# Patient Record
Sex: FEMALE | Employment: FULL TIME | URBAN - METROPOLITAN AREA
[De-identification: names, ages, dates, MRNs, and addresses within clinical notes are randomized per-mention and may not be internally consistent; named-entity substitution may affect disease eponyms.]

---

## 2024-10-26 ENCOUNTER — HOSPITAL ENCOUNTER (EMERGENCY)
Facility: HOSPITAL | Age: 34
Discharge: HOME | End: 2024-10-26
Attending: EMERGENCY MEDICINE

## 2024-10-26 ENCOUNTER — APPOINTMENT (EMERGENCY)
Dept: CT IMAGING | Facility: HOSPITAL | Age: 34
End: 2024-10-26

## 2024-10-26 VITALS
RESPIRATION RATE: 15 BRPM | SYSTOLIC BLOOD PRESSURE: 107 MMHG | OXYGEN SATURATION: 99 % | TEMPERATURE: 98.4 F | WEIGHT: 158.07 LBS | DIASTOLIC BLOOD PRESSURE: 72 MMHG | HEART RATE: 69 BPM

## 2024-10-26 DIAGNOSIS — N12 PYELONEPHRITIS: Primary | ICD-10-CM

## 2024-10-26 DIAGNOSIS — D64.9 ANEMIA: ICD-10-CM

## 2024-10-26 LAB
ALBUMIN SERPL BCG-MCNC: 3.9 G/DL (ref 3.5–5)
ALP SERPL-CCNC: 97 U/L (ref 34–104)
ALT SERPL W P-5'-P-CCNC: 16 U/L (ref 7–52)
ANION GAP SERPL CALCULATED.3IONS-SCNC: 6 MMOL/L (ref 4–13)
AST SERPL W P-5'-P-CCNC: 24 U/L (ref 13–39)
BACTERIA UR QL AUTO: NORMAL /HPF
BASOPHILS # BLD AUTO: 0.1 THOUSANDS/ΜL (ref 0–0.1)
BASOPHILS NFR BLD AUTO: 2 % (ref 0–1)
BILIRUB SERPL-MCNC: 0.23 MG/DL (ref 0.2–1)
BILIRUB UR QL STRIP: NEGATIVE
BUN SERPL-MCNC: 8 MG/DL (ref 5–25)
CALCIUM SERPL-MCNC: 8.5 MG/DL (ref 8.4–10.2)
CHLORIDE SERPL-SCNC: 106 MMOL/L (ref 96–108)
CLARITY UR: CLEAR
CO2 SERPL-SCNC: 26 MMOL/L (ref 21–32)
COLOR UR: YELLOW
CREAT SERPL-MCNC: 0.78 MG/DL (ref 0.6–1.3)
EOSINOPHIL # BLD AUTO: 0.18 THOUSAND/ΜL (ref 0–0.61)
EOSINOPHIL NFR BLD AUTO: 3 % (ref 0–6)
ERYTHROCYTE [DISTWIDTH] IN BLOOD BY AUTOMATED COUNT: 18.6 % (ref 11.6–15.1)
EXT PREGNANCY TEST URINE: NEGATIVE
EXT. CONTROL: NORMAL
FERRITIN SERPL-MCNC: 4 NG/ML (ref 11–307)
GFR SERPL CREATININE-BSD FRML MDRD: 98 ML/MIN/1.73SQ M
GLUCOSE SERPL-MCNC: 89 MG/DL (ref 65–140)
GLUCOSE UR STRIP-MCNC: NEGATIVE MG/DL
HCT VFR BLD AUTO: 27 % (ref 34.8–46.1)
HGB BLD-MCNC: 7.9 G/DL (ref 11.5–15.4)
HGB UR QL STRIP.AUTO: NEGATIVE
IMM GRANULOCYTES # BLD AUTO: 0.01 THOUSAND/UL (ref 0–0.2)
IMM GRANULOCYTES NFR BLD AUTO: 0 % (ref 0–2)
IRON SATN MFR SERPL: 1 % (ref 15–50)
IRON SERPL-MCNC: 10 UG/DL (ref 50–212)
KETONES UR STRIP-MCNC: NEGATIVE MG/DL
LEUKOCYTE ESTERASE UR QL STRIP: ABNORMAL
LIPASE SERPL-CCNC: 56 U/L (ref 11–82)
LYMPHOCYTES # BLD AUTO: 2.76 THOUSANDS/ΜL (ref 0.6–4.47)
LYMPHOCYTES NFR BLD AUTO: 45 % (ref 14–44)
MCH RBC QN AUTO: 20.2 PG (ref 26.8–34.3)
MCHC RBC AUTO-ENTMCNC: 29.3 G/DL (ref 31.4–37.4)
MCV RBC AUTO: 69 FL (ref 82–98)
MONOCYTES # BLD AUTO: 0.95 THOUSAND/ΜL (ref 0.17–1.22)
MONOCYTES NFR BLD AUTO: 16 % (ref 4–12)
NEUTROPHILS # BLD AUTO: 2.04 THOUSANDS/ΜL (ref 1.85–7.62)
NEUTS SEG NFR BLD AUTO: 34 % (ref 43–75)
NITRITE UR QL STRIP: NEGATIVE
NON-SQ EPI CELLS URNS QL MICRO: NORMAL /HPF
NRBC BLD AUTO-RTO: 0 /100 WBCS
PH UR STRIP.AUTO: 7 [PH] (ref 4.5–8)
PLATELET # BLD AUTO: 179 THOUSANDS/UL (ref 149–390)
PMV BLD AUTO: 10.3 FL (ref 8.9–12.7)
POTASSIUM SERPL-SCNC: 3.2 MMOL/L (ref 3.5–5.3)
PROT SERPL-MCNC: 7.1 G/DL (ref 6.4–8.4)
PROT UR STRIP-MCNC: NEGATIVE MG/DL
RBC # BLD AUTO: 3.92 MILLION/UL (ref 3.81–5.12)
RBC #/AREA URNS AUTO: NORMAL /HPF
SODIUM SERPL-SCNC: 138 MMOL/L (ref 135–147)
SP GR UR STRIP.AUTO: 1.01 (ref 1–1.03)
TIBC SERPL-MCNC: 1191 UG/DL (ref 250–450)
UIBC SERPL-MCNC: 1181 UG/DL (ref 155–355)
UROBILINOGEN UR QL STRIP.AUTO: 0.2 E.U./DL
WBC # BLD AUTO: 6.04 THOUSAND/UL (ref 4.31–10.16)
WBC #/AREA URNS AUTO: NORMAL /HPF

## 2024-10-26 PROCEDURE — 85025 COMPLETE CBC W/AUTO DIFF WBC: CPT

## 2024-10-26 PROCEDURE — 87077 CULTURE AEROBIC IDENTIFY: CPT

## 2024-10-26 PROCEDURE — 87086 URINE CULTURE/COLONY COUNT: CPT

## 2024-10-26 PROCEDURE — 82728 ASSAY OF FERRITIN: CPT

## 2024-10-26 PROCEDURE — 83550 IRON BINDING TEST: CPT

## 2024-10-26 PROCEDURE — 96365 THER/PROPH/DIAG IV INF INIT: CPT

## 2024-10-26 PROCEDURE — 99285 EMERGENCY DEPT VISIT HI MDM: CPT

## 2024-10-26 PROCEDURE — 74177 CT ABD & PELVIS W/CONTRAST: CPT

## 2024-10-26 PROCEDURE — 87491 CHLMYD TRACH DNA AMP PROBE: CPT

## 2024-10-26 PROCEDURE — 96361 HYDRATE IV INFUSION ADD-ON: CPT

## 2024-10-26 PROCEDURE — 83690 ASSAY OF LIPASE: CPT

## 2024-10-26 PROCEDURE — 83540 ASSAY OF IRON: CPT

## 2024-10-26 PROCEDURE — 96375 TX/PRO/DX INJ NEW DRUG ADDON: CPT

## 2024-10-26 PROCEDURE — 99284 EMERGENCY DEPT VISIT MOD MDM: CPT

## 2024-10-26 PROCEDURE — 81025 URINE PREGNANCY TEST: CPT

## 2024-10-26 PROCEDURE — 87591 N.GONORRHOEAE DNA AMP PROB: CPT

## 2024-10-26 PROCEDURE — 80053 COMPREHEN METABOLIC PANEL: CPT

## 2024-10-26 PROCEDURE — 36415 COLL VENOUS BLD VENIPUNCTURE: CPT

## 2024-10-26 PROCEDURE — 81001 URINALYSIS AUTO W/SCOPE: CPT

## 2024-10-26 RX ORDER — ACETAMINOPHEN 10 MG/ML
1000 INJECTION, SOLUTION INTRAVENOUS ONCE
Status: COMPLETED | OUTPATIENT
Start: 2024-10-26 | End: 2024-10-26

## 2024-10-26 RX ORDER — ONDANSETRON 4 MG/1
4 TABLET, ORALLY DISINTEGRATING ORAL EVERY 6 HOURS PRN
Qty: 20 TABLET | Refills: 0 | Status: SHIPPED | OUTPATIENT
Start: 2024-10-26

## 2024-10-26 RX ORDER — CEPHALEXIN 500 MG/1
500 CAPSULE ORAL 2 TIMES DAILY
Qty: 14 CAPSULE | Refills: 0 | Status: SHIPPED | OUTPATIENT
Start: 2024-10-26 | End: 2024-11-02

## 2024-10-26 RX ORDER — KETOROLAC TROMETHAMINE 30 MG/ML
15 INJECTION, SOLUTION INTRAMUSCULAR; INTRAVENOUS ONCE
Status: COMPLETED | OUTPATIENT
Start: 2024-10-26 | End: 2024-10-26

## 2024-10-26 RX ORDER — PHENAZOPYRIDINE HYDROCHLORIDE 200 MG/1
200 TABLET, FILM COATED ORAL 3 TIMES DAILY
Qty: 5 TABLET | Refills: 0 | Status: SHIPPED | OUTPATIENT
Start: 2024-10-26

## 2024-10-26 RX ORDER — ONDANSETRON 2 MG/ML
4 INJECTION INTRAMUSCULAR; INTRAVENOUS ONCE
Status: COMPLETED | OUTPATIENT
Start: 2024-10-26 | End: 2024-10-26

## 2024-10-26 RX ORDER — METOCLOPRAMIDE HYDROCHLORIDE 5 MG/ML
10 INJECTION INTRAMUSCULAR; INTRAVENOUS ONCE
Status: COMPLETED | OUTPATIENT
Start: 2024-10-26 | End: 2024-10-26

## 2024-10-26 RX ORDER — PHENAZOPYRIDINE HYDROCHLORIDE 100 MG/1
100 TABLET, FILM COATED ORAL ONCE
Status: COMPLETED | OUTPATIENT
Start: 2024-10-26 | End: 2024-10-26

## 2024-10-26 RX ORDER — FERROUS SULFATE 325(65) MG
325 TABLET ORAL DAILY
Qty: 30 TABLET | Refills: 0 | Status: SHIPPED | OUTPATIENT
Start: 2024-10-26

## 2024-10-26 RX ADMIN — CEPHALEXIN 500 MG: 250 CAPSULE ORAL at 15:41

## 2024-10-26 RX ADMIN — PHENAZOPYRIDINE 100 MG: 100 TABLET ORAL at 15:41

## 2024-10-26 RX ADMIN — KETOROLAC TROMETHAMINE 15 MG: 30 INJECTION, SOLUTION INTRAMUSCULAR; INTRAVENOUS at 12:48

## 2024-10-26 RX ADMIN — ACETAMINOPHEN 1000 MG: 10 INJECTION INTRAVENOUS at 15:41

## 2024-10-26 RX ADMIN — ONDANSETRON 4 MG: 2 INJECTION INTRAMUSCULAR; INTRAVENOUS at 12:48

## 2024-10-26 RX ADMIN — IOHEXOL 100 ML: 350 INJECTION, SOLUTION INTRAVENOUS at 14:19

## 2024-10-26 RX ADMIN — SODIUM CHLORIDE 1000 ML: 0.9 INJECTION, SOLUTION INTRAVENOUS at 12:48

## 2024-10-26 RX ADMIN — METOCLOPRAMIDE 10 MG: 5 INJECTION, SOLUTION INTRAMUSCULAR; INTRAVENOUS at 14:37

## 2024-10-26 NOTE — ED PROVIDER NOTES
Time reflects when diagnosis was documented in both MDM as applicable and the Disposition within this note       Time User Action Codes Description Comment    10/26/2024  4:58 PM Marivel Fatima [N12] Pyelonephritis     10/26/2024  5:00 PM Marivel Fatima [D64.9] Anemia           ED Disposition       ED Disposition   Discharge    Condition   Stable    Date/Time   Sat Oct 26, 2024  4:58 PM    Comment   Linda Carroll discharge to home/self care.                   Assessment & Plan       Medical Decision Making  Patient is a 34 year old female presenting with flank/abdominal pain and  urinary symptoms for 1 week. She is tachycardic on arrival but remaining vitals are within normal limits.    Ddx: UTI, ureteral stones, gallbladder pathology, pancreatitis, gastritis.   Plan: CBC, CMP, lipase, UA, urine pregnancy, CT.  Symptomatic management: zofran, toradol, fluids.    Labs without leukocytosis, significant electrolyte disturbance, LEA, elevated LFTs or lipase.  Hemoglobin is decreased at 7.9 and there were no prior records for comparison.  Anemia is microcytic consistent with history of iron deficiency anemia and patient is no longer taking iron supplementation.  Hemoccult was negative, UA without evidence of blood, CT without evidence of active bleeding.  She does not meet transfusion threshold.  Patient can be managed outpatient without active bleeding, will start oral iron supplementation.  UA has small leukocytes but no nitrites, blood, bacteria.  CT shows findings suspicious for focal pyelonephritis along the upper pole of the left kidney without hydronephrosis or perinephric abscess.  Nonobstructing left nephrolithiasis.    Discussed findings with patient.  Gave first dose of Keflex and Pyridium in the ED and will discharge with remaining course.  Advised her to return the ED with any fever or worsening symptoms.  Also prescribed iron supplementation.  Placed ambulatory referral and contact information  for primary care to establish care for further management of anemia.    I have discussed findings and plan for discharge with the patient/caregiver. Follow up with the appropriate providers including primary care physician was discussed. Return precautions discussed with patient/caregiver as outlined in AVS. Patient/caregiver verbally expressed understanding. Patient stable at time of discharge and ambulated out of the emergency department.     Amount and/or Complexity of Data Reviewed  Labs: ordered. Decision-making details documented in ED Course.  Radiology: ordered. Decision-making details documented in ED Course.    Risk  OTC drugs.  Prescription drug management.        ED Course as of 10/26/24 1814   Sat Oct 26, 2024   1234 Pulse(!): 121   1451 WBC: 6.04  Does not meet SIRS criteria.    1451 Hemoglobin(!): 7.9  Hx of anemia requiring transfusion. Unclear if this is baseline as there are no prior records for review. Hemoccult negative and no blood in urine. No evidence of active bleeding.   1537 CT abdomen pelvis w contrast  IMPRESSION:     Findings suspicious for focal pyelonephritis along the upper pole of the left kidney. No hydronephrosis or perinephric abscess.     Nonobstructing left nephrolithiasis.         Medications   sodium chloride 0.9 % bolus 1,000 mL (0 mL Intravenous Stopped 10/26/24 1756)   ondansetron (ZOFRAN) injection 4 mg (4 mg Intravenous Given 10/26/24 1248)   ketorolac (TORADOL) injection 15 mg (15 mg Intravenous Given 10/26/24 1248)   metoclopramide (REGLAN) injection 10 mg (10 mg Intravenous Given 10/26/24 1437)   iohexol (OMNIPAQUE) 350 MG/ML injection (SINGLE-DOSE) 100 mL (100 mL Intravenous Given 10/26/24 1419)   cephalexin (KEFLEX) capsule 500 mg (500 mg Oral Given 10/26/24 1541)   phenazopyridine (PYRIDIUM) tablet 100 mg (100 mg Oral Given 10/26/24 1541)   acetaminophen (Ofirmev) injection 1,000 mg (0 mg Intravenous Stopped 10/26/24 1644)       ED Risk Strat Scores        "                                        History of Present Illness       Chief Complaint   Patient presents with    Flank Pain     Pt states she has \"kidney problems\" Pt statess when she urinates it burns and she bleeds vaginally. Pt does not know the name of her \"kidney problem\" but states she has been hospitalized multiple times for this problem        Past Medical History:   Diagnosis Date    Anemia       History reviewed. No pertinent surgical history.   History reviewed. No pertinent family history.   Social History     Tobacco Use    Smoking status: Never    Smokeless tobacco: Never      E-Cigarette/Vaping      E-Cigarette/Vaping Substances      I have reviewed and agree with the history as documented.     Patient is a 34 year old female with a history of anemia requiring transfusion presenting with bilateral flank pain and abdominal pain for 1 week. States she has a history of kidney problems that have required hospitalization in the past both in the  and [place] however she is unsure what the kidney problems were and there are no prior records available for review. She is reporting hematuria, dysuria, nausea, and vomiting. No diarrhea or constipation.  She does report occasional black stools, most recently yesterday.  No fevers, chills, URI symptoms, chest pain, shortness of breath, lightheadedness.      Flank Pain  Associated symptoms: dysuria, hematuria, nausea and vomiting    Associated symptoms: no chest pain, no chills, no cough, no diarrhea, no fever, no shortness of breath, no sore throat, no vaginal bleeding and no vaginal discharge        Review of Systems   Constitutional:  Negative for chills and fever.   HENT:  Negative for congestion, ear pain and sore throat.    Eyes:  Negative for pain and visual disturbance.   Respiratory:  Negative for cough and shortness of breath.    Cardiovascular:  Negative for chest pain and palpitations.   Gastrointestinal:  Positive for abdominal pain, nausea and " vomiting. Negative for diarrhea.   Genitourinary:  Positive for dysuria, flank pain and hematuria. Negative for vaginal bleeding and vaginal discharge.   Musculoskeletal:  Negative for arthralgias and back pain.   Skin:  Negative for color change and rash.   Neurological:  Negative for seizures, syncope and light-headedness.   All other systems reviewed and are negative.          Objective       ED Triage Vitals   Temperature Pulse Blood Pressure Respirations SpO2 Patient Position - Orthostatic VS   10/26/24 1203 10/26/24 1203 10/26/24 1203 10/26/24 1203 10/26/24 1203 10/26/24 1203   98.4 °F (36.9 °C) (!) 121 157/87 20 98 % Sitting      Temp Source Heart Rate Source BP Location FiO2 (%) Pain Score    10/26/24 1203 10/26/24 1203 10/26/24 1203 -- 10/26/24 1248    Oral Monitor Right arm  10 - Worst Possible Pain      Vitals      Date and Time Temp Pulse SpO2 Resp BP Pain Score FACES Pain Rating User   10/26/24 1542 -- 69 99 % 15 107/72 No Pain -- KG   10/26/24 1252 -- 94 99 % 16 127/63 2 -- KG   10/26/24 1248 -- -- -- -- -- 10 - Worst Possible Pain -- KG   10/26/24 1203 98.4 °F (36.9 °C) 121 98 % 20 157/87 -- -- ML            Physical Exam  Vitals and nursing note reviewed.   Constitutional:       General: She is not in acute distress.     Appearance: Normal appearance. She is not toxic-appearing.   HENT:      Head: Normocephalic and atraumatic.      Right Ear: External ear normal.      Left Ear: External ear normal.      Nose: Nose normal.      Mouth/Throat:      Mouth: Mucous membranes are moist.   Eyes:      General: No scleral icterus.        Right eye: No discharge.         Left eye: No discharge.      Extraocular Movements: Extraocular movements intact.      Conjunctiva/sclera: Conjunctivae normal.   Cardiovascular:      Rate and Rhythm: Normal rate and regular rhythm.      Pulses: Normal pulses.      Heart sounds: Normal heart sounds.   Pulmonary:      Effort: Pulmonary effort is normal. No respiratory distress.       Breath sounds: Normal breath sounds.   Abdominal:      Palpations: Abdomen is soft.      Tenderness: There is abdominal tenderness in the left upper quadrant and left lower quadrant. There is right CVA tenderness. There is no left CVA tenderness, guarding or rebound.   Musculoskeletal:         General: No tenderness, deformity or signs of injury.      Cervical back: Normal range of motion and neck supple.   Skin:     General: Skin is dry.      Coloration: Skin is not jaundiced.      Findings: No erythema or rash.   Neurological:      General: No focal deficit present.      Mental Status: She is alert and oriented to person, place, and time. Mental status is at baseline.      Motor: No weakness.      Gait: Gait normal.   Psychiatric:         Mood and Affect: Mood normal.         Behavior: Behavior normal.         Thought Content: Thought content normal.         Results Reviewed       Procedure Component Value Units Date/Time    TIBC Panel (incl. Iron, TIBC, % Iron Saturation) [[id number]] Collected: 10/26/24 1541    Lab Status: In process Specimen: Blood from Arm, Left Updated: 10/26/24 1547    Urine culture [[id number]] Collected: 10/26/24 1541    Lab Status: In process Specimen: Urine, Clean Catch Updated: 10/26/24 1547    Ferritin [[id number]] Collected: 10/26/24 1541    Lab Status: In process Specimen: Blood from Arm, Left Updated: 10/26/24 1547    Chlamydia/GC amplified DNA by PCR [[id number]] Collected: 10/26/24 1442    Lab Status: In process Specimen: Urine, Other Updated: 10/26/24 1451    Comprehensive metabolic panel [[id number]]  (Abnormal) Collected: 10/26/24 1246    Lab Status: Final result Specimen: Blood from Arm, Left Updated: 10/26/24 1328     Sodium 138 mmol/L      Potassium 3.2 mmol/L      Chloride 106 mmol/L      CO2 26 mmol/L      ANION GAP 6 mmol/L      BUN 8 mg/dL      Creatinine 0.78 mg/dL      Glucose 89 mg/dL      Calcium 8.5 mg/dL      AST 24 U/L      ALT 16 U/L      Alkaline Phosphatase 97  U/L      Total Protein 7.1 g/dL      Albumin 3.9 g/dL      Total Bilirubin 0.23 mg/dL      eGFR 98 ml/min/1.73sq m     Narrative:      [employer] guidelines for Chronic Kidney Disease (CKD):     Stage 1 with normal or high GFR (GFR > 90 mL/min/1.73 square meters)    Stage 2 Mild CKD (GFR = 60-89 mL/min/1.73 square meters)    Stage 3A Moderate CKD (GFR = 45-59 mL/min/1.73 square meters)    Stage 3B Moderate CKD (GFR = 30-44 mL/min/1.73 square meters)    Stage 4 Severe CKD (GFR = 15-29 mL/min/1.73 square meters)    Stage 5 End Stage CKD (GFR <15 mL/min/1.73 square meters)  Note: GFR calculation is accurate only with a steady state creatinine    Lipase [[id number]]  (Normal) Collected: 10/26/24 1246    Lab Status: Final result Specimen: Blood from Arm, Left Updated: 10/26/24 1328     Lipase 56 u/L     Urine Microscopic [[id number]]  (Normal) Collected: 10/26/24 1254    Lab Status: Final result Specimen: Urine, Clean Catch Updated: 10/26/24 1328     RBC, UA None Seen /hpf      WBC, UA None Seen /hpf      Epithelial Cells Occasional /hpf      Bacteria, UA None Seen /hpf     CBC and differential [[id number]]  (Abnormal) Collected: 10/26/24 1246    Lab Status: Final result Specimen: Blood from Arm, Left Updated: 10/26/24 1305     WBC 6.04 Thousand/uL      RBC 3.92 Million/uL      Hemoglobin 7.9 g/dL      Hematocrit 27.0 %      MCV 69 fL      MCH 20.2 pg      MCHC 29.3 g/dL      RDW 18.6 %      MPV 10.3 fL      Platelets 179 Thousands/uL      nRBC 0 /100 WBCs      Segmented % 34 %      Immature Grans % 0 %      Lymphocytes % 45 %      Monocytes % 16 %      Eosinophils Relative 3 %      Basophils Relative 2 %      Absolute Neutrophils 2.04 Thousands/µL      Absolute Immature Grans 0.01 Thousand/uL      Absolute Lymphocytes 2.76 Thousands/µL      Absolute Monocytes 0.95 Thousand/µL      Eosinophils Absolute 0.18 Thousand/µL      Basophils Absolute 0.10 Thousands/µL     Urine Macroscopic, POC [[id number]]   (Abnormal) Collected: 10/26/24 1254    Lab Status: Final result Specimen: Urine Updated: 10/26/24 1255     Color, UA Yellow     Clarity, UA Clear     pH, UA 7.0     Leukocytes, UA Small     Nitrite, UA Negative     Protein, UA Negative mg/dl      Glucose, UA Negative mg/dl      Ketones, UA Negative mg/dl      Urobilinogen, UA 0.2 E.U./dl      Bilirubin, UA Negative     Occult Blood, UA Negative     Specific Gravity, UA 1.010    Narrative:      CLINITEK RESULT    POCT pregnancy, urine [[id number]]  (Normal) Resulted: 10/26/24 1246    Lab Status: Final result Updated: 10/26/24 1246     EXT Preg Test, Ur Negative     Control Valid            CT abdomen pelvis w contrast   Final Interpretation by Roshan Bautista MD (10/26 1437)      Findings suspicious for focal pyelonephritis along the upper pole of the left kidney. No hydronephrosis or perinephric abscess.      Nonobstructing left nephrolithiasis.      The study was marked in EPIC for immediate notification.         Workstation performed: [id number]             Procedures    ED Medication and Procedure Management   None     Discharge Medication List as of 10/26/2024  5:15 PM        START taking these medications    Details   cephalexin (KEFLEX) 500 mg capsule Take 1 capsule (500 mg total) by mouth 2 (two) times a day for 7 days, Starting Sat 10/26/2024, Until Sat 11/2/2024, Normal      ferrous sulfate 325 (65 Fe) mg tablet Take 1 tablet (325 mg total) by mouth daily, Starting Sat 10/26/2024, Normal      phenazopyridine (PYRIDIUM) 200 mg tablet Take 1 tablet (200 mg total) by mouth 3 (three) times a day, Starting Sat 10/26/2024, Normal             ED SEPSIS DOCUMENTATION   Time reflects when diagnosis was documented in both MDM as applicable and the Disposition within this note       Time User Action Codes Description Comment    10/26/2024  4:58 PM Marivel Fatima [N12] Pyelonephritis     10/26/2024  5:00 PM Marivel Fatima [D64.9] Anemia            Initial  Sepsis Screening       Row Name 10/26/24 1017                Is the patient's history suggestive of a new or worsening infection? Yes (Proceed)  -MV        Suspected source of infection urinary tract infection  -MV        Indicate SIRS criteria Tachycardia > 90 bpm  -MV        Are two or more of the above signs & symptoms of infection both present and new to the patient? No  -MV                  User Key  (r) = Recorded By, (t) = Taken By, (c) = Cosigned By      Initials Name Provider Type    MV Marivel Fatima PA-C Physician Assistant                       Marivel Fatima PA-C  10/26/24 8966

## 2024-10-26 NOTE — Clinical Note
Linda Carroll was seen and treated in our emergency department on 10/26/2024.                Diagnosis:     Linda  may return to work on return date.    She may return on this date: 10/30/2024         If you have any questions or concerns, please don't hesitate to call.      Marivel Fatima PA-C    ______________________________           _______________          _______________  Hospital Representative                              Date                                Time

## 2024-10-26 NOTE — DISCHARGE INSTRUCTIONS
Take antibiotics to treat kidney infection. Return to emergency department if you develop fever or worsening symptoms.    Start taking iron supplements to help with anemia. Follow up with primary care doctor for further management.

## 2024-10-26 NOTE — Clinical Note
Linda Carroll was seen and treated in our emergency department on 10/26/2024.                Diagnosis:     Linda  may return to work on return date.    She may return on this date: 10/27/2024         If you have any questions or concerns, please don't hesitate to call.      Marivel Fatima PA-C    ______________________________           _______________          _______________  Hospital Representative                              Date                                Time

## 2024-10-28 LAB
BACTERIA UR CULT: ABNORMAL
BACTERIA UR CULT: ABNORMAL
C TRACH DNA SPEC QL NAA+PROBE: NEGATIVE
N GONORRHOEA DNA SPEC QL NAA+PROBE: NEGATIVE